# Patient Record
(demographics unavailable — no encounter records)

---

## 2024-10-08 NOTE — HISTORY OF PRESENT ILLNESS
[FreeTextEntry1] : Refer to intake documentation from 9/10/24 for details.  [FreeTextEntry2] : Refer to intake documentation from 9/10/24 for details.  [FreeTextEntry3] : Current psych meds: --Adderall XR   Past psych meds: --Adderall XR 5-10 mg  --Vyvanse  PDMP reviewed, ref# [id number].

## 2024-10-08 NOTE — PLAN
[FreeTextEntry4] : Psychotherapy documentation:  Time spent for Psychotherapy: 20 minutes Modality: Supportive psychotherapy and psychoeducation and CBT Goal: Reduction in symptoms of depression and anxiety.   Focus of session: 1. Discussed with the patient the issues at home due to her 's own mood/anxiety issues in setting of his job, unwillingness to seek help. Discussed how this impacts her and the difficulties around communication between them.  2. Psychoeducation about medications, risk vs benefits of medications, role of behavior activation and coping skills to help in improving their mood and anxiety symptoms and daily functioning.  [FreeTextEntry5] : 1. ADHD --Continue Adderall XR 10 mg daily --Follow-up in 2 months or sooner PRN  2. Adjustment disorder with anxiety --Continue psychotherapy with Elpidio TOMLINSON [facility] reviewed prior to all controlled substance prescriptions, ref#[id number].  - I have given my usual talk about the risks and benefits of all treatment recommendations including alternatives and the risks and benefits of no treatment at all. Patient had the opportunity to have all questions answered to their satisfaction. They expressed understanding and agreement with the above treatment plan. - Educated patient of importance of remaining abstinent from drugs and alcohol. - Discussed that unpredictable effects including cardiorespiratory collapse can result from the combination of illicit drugs and prescribed medications. Patient verbalized understanding. - Emergency procedures were discussed: pt. educated to call 911 or go to nearest ER for worsening of symptoms/suicidal/homicidal ideation. - Patient given opportunity to ask questions and their questions were answered and they expressed understanding and agreement with above plan.

## 2024-10-08 NOTE — PLAN
[FreeTextEntry2] : Goal: Client will develop and utilize a detailed relapse prevention plan that addresses mental health symptoms. Objective: Client will replace negative and self-defeating self-talk with verbalization of realistic and positive cognitive messages. Intervention: Clinician will assist Client to practice using healthy communication techniques in order to communicate his feelings and needs in order to help avoid isolating when experiencing depression or anxiety Intervention: Clinician will teach Client to implement a thought-stopping technique that cognitively interferes with obsessions. Review use of this technique in daily living situation Intervention: Clinician will reinforce positive, reality based cognitive messages that enhance self-confidence and increase adaptive action [Acceptance and Commitment Therapy] : Acceptance and Commitment Therapy  [Cognitive and/or Behavior Therapy] : Cognitive and/or Behavior Therapy  [Psychodynamic Therapy] : Psychodynamic Therapy  [Psychoeducation] : Psychoeducation  [Skills training (all types)] : Skills training (all types)  [Supportive Therapy] : Supportive Therapy [de-identified] : Patient presented as oriented x three and with stable mood. No SI or HI when assessed. Gestalt psychodymnamic chair work done today to help client assert needs, communicate more effectively and track and feel out emotions and experience. This allowed client to find greater calm and confidence in addressing conflict. Supportive therapy provided with emotional support and validaiton.  Mindfulness resources provided.  [FreeTextEntry1] : Client to attend weekly appointments to meet tx goals.

## 2024-10-08 NOTE — PAST MEDICAL HISTORY
[FreeTextEntry1] : Reviewed.   PCP: looking for new provider  Hx of TBI: denied  Hx of Seizures: denied Hx of Migraine HA: denied   Relevant Labs/EKG: none in chart for review

## 2024-10-08 NOTE — DISCUSSION/SUMMARY
[Low acute suicide risk] : Low acute suicide risk [No] : No [Not clinically indicated] : Safety Plan completed/updated (for individuals at risk): Not clinically indicated [FreeTextEntry1] : 9/10/24- Start Adderall XR 10 mg daily. Follow-up in 3-4 weeks. Refer to psychotherapy. Provided with  Health resources. 10/8/24-

## 2024-10-08 NOTE — RISK ASSESSMENT
[Clinical Interview] : Clinical Interview [None in the patient's lifetime] : None in the patient's lifetime [None Known] : none known [No, patient denies ideation or behavior] : No, patient denies ideation or behavior [Low acute suicide risk] : Low acute suicide risk [No] : No [Not clinically indicated] : Safety Plan completed/updated (for individuals at risk): Not clinically indicated

## 2024-10-16 NOTE — PLAN
[FreeTextEntry2] : Goal: Client will develop and utilize a detailed relapse prevention plan that addresses mental health symptoms. Objective: Client will replace negative and self-defeating self-talk with verbalization of realistic and positive cognitive messages. Intervention: Clinician will assist Client to practice using healthy communication techniques in order to communicate his feelings and needs in order to help avoid isolating when experiencing depression or anxiety Intervention: Clinician will teach Client to implement a thought-stopping technique that cognitively interferes with obsessions. Review use of this technique in daily living situation Intervention: Clinician will reinforce positive, reality based cognitive messages that enhance self-confidence and increase adaptive action [Acceptance and Commitment Therapy] : Acceptance and Commitment Therapy  [Cognitive and/or Behavior Therapy] : Cognitive and/or Behavior Therapy  [Psychodynamic Therapy] : Psychodynamic Therapy  [Psychoeducation] : Psychoeducation  [Skills training (all types)] : Skills training (all types)  [Supportive Therapy] : Supportive Therapy [de-identified] : Patient presented as oriented x three and with stable mood. No SI or HI when assessed. Session focus on helping client not reduce emotions and invalidate self and own needs. Psychodynamic work done between parts of self doing same. Patient was able to recognize and feel out difficult emotions and utilize healthier assertions for self for needs and wants. Psychoeducation provided on healthy communication skills and healthy relationship skills.  Mindfulness resources provided.  [FreeTextEntry1] : Client to attend weekly appointments to meet tx goals.

## 2024-10-28 NOTE — REASON FOR VISIT
[Follow-Up] : a follow-up visit [TextBox_44] : SOB, mild persistent asthma, allergies, (+) immunodeficiency

## 2024-10-28 NOTE — HISTORY OF PRESENT ILLNESS
[TextBox_4] : Ms. ROSE is a 30 year old female with a history of COVID-19 x3 (3/2020, 2021, 2023) protracted illness, post-COVID-19 once or twice yearly bronchitis, post-COVID-19 VANCE upon exercise and URIs, seasonal allergies pre-COVID-19, sciatica presenting to the office today for a f/u  pulmonary evaluation. Her chief complaint is    -she notes URI 2 weeks ago -she notes she had an intense cough, potentially mild fever -she notes she was given Emperic, Zithromax, prednisone -she notes she is feeling better at time of visit -she notes she still has an occasional mild cough in the morning, which subsides throughout the day she notes liking Breztri -she notes she had hoarseness when sick but it has subsided -she notes she was having a hard time sleeping on prednisone, but symptoms have subsided -she notes sleeping for 7 hours -she notes complying with her Adderall prescription -she denies any back pain or neck pain -she notes occasional numbness in calf after working out    -she denies any headaches, nausea, emesis, fever, chills, sweats, chest pain, chest pressure, wheezing, palpitations, diarrhea, constipation, dysphagia, vertigo, myalgias, leg swelling, itchy eyes, itchy ears, heartburn, reflux, or sour taste in the mouth.

## 2024-10-28 NOTE — ADDENDUM
[FreeTextEntry1] : Documented by Don Alarcon acting as a scribe for Dr. Tony Kearney on 10/28/2024 All medical record entries made by the Scribe were at my, Dr. Tony Kearney's, direction and personally dictated by me on 10/28/2024 . I have reviewed the chart and agree that the record accurately reflects my personal performance of the history, physical exam, assessment and plan. I have also personally directed, reviewed, and agree with the discharge instructions.

## 2024-10-28 NOTE — ASSESSMENT
[FreeTextEntry1] : Ms. ROSE is a 30 year old female with a history of COVID-19 x3 (3/2020, 2021, 2023) protracted illness, post-COVID-19 once or twice yearly bronchitis, post-COVID-19 VANCE upon exercise and URIs, seasonal allergies pre-COVID-19, sciatica who now comes to the office for a f/u pulmonary evaluation for SOB, mild intermittent asthma, allergies, - now with s/p resp sx- ? mPNA atypical infection- improved; (+) immunodeficiency - s/p pertussis - resolved   Her shortness of breath is multifactorial due to: -poor mechanics of breathing -pulmonary disease   -persistent asthma -cardiac disease    -doubtful   Problem 1: Mild intermittent asthma (stable) -continue Airsupra 2 inhalations QD once available -add Breztri 2 puffs BID -Asthma is believed to be caused by inherited (genetic) and environmental factor, but its exact cause is unknown. Asthma may be triggered by allergens, lung infections, or irritants in the air. Asthma triggers are different for each person.  -Inhaler technique reviewed as well as oral hygiene techniques reviewed with patient. Avoidance of cold air, extremes of temperature, rescue inhaler should be used before exercise. Order of medication reviewed with patient. Recommended use of a cool mist humidifier in the bedroom   Problem 1A: URI - s/p 10/2024  -s/p Zithromax 500mg QDay x 7D  -s/p  Promethazine DM 2 Q6H  problem 1B: (+) immune deficiency -s/p Complete blood work to include: quantitative immunoglobulins, IgG WNL subsets, strep pneumonia titers low, CF panel WNL -recommended Prevnar 20 -Due to the fact that this pt has had more infections than would be expected and immunological blood work is indicated this would include: IgG subclasses, quantitative immunoglobulins, Strep pneumoniae titers as well as Vitamin D levels. Based on this blood work we will be able to decide where the pt needs additional pneumococcal vaccine either Prevnar 13 or pneumovax. Immunology evaluation will also be potentially indicated.  Problem 2: allergies/sinus -Dymista 1 sniff each nostril BID  -continue Zyrtec 5 mg QHS PRN -complete blood work to include: (+) asthma panel, food IgE panel, IgE level, eosinophil level, vitamin D level (NC) -Environmental measures for allergies were encouraged including mattress and pillow covers, air purifier, and environmental controls.   Problem  2A: Low Vitamin D -on Rx -Low vitamin D levels have been associated with asthma exacerbations and increased allergic symptoms. The goal, based on recent information, is maintaining levels between 50-70 and low normal is 30. Recommended 50,000 units every two weeks to once a month depending on the level.  Problem 3: "Orthopedic" Maladies- Sciatica, Neck pain -s/p guided through stretches  Problem 4: cardiac disease -recommended to continue to follow up with Cardiologist if needed  Problem 5: poor breathing mechanics -Recommended Roseanne Waldrop and Mike breathing technique -Proper breathing techniques were reviewed with an emphasis on exhalation. Patient was instructed to breathe in for 1 second and out for 4 seconds. The patient was encouraged to not talk while walking.   Problem 6: health maintenance -recommended yearly flu shot after October 15, 2023 -recommended strep pneumonia vaccines: Prevnar-20 vaccine, followed by Pneumo vaccine 23 one year following after 65 years old. -recommended early intervention for Upper Respiratory Infections (URIs) -recommended regular osteoporosis evaluations -recommended early dermatological evaluations -recommended after the age of 50 to the age of 70, colonoscopy every 5 years   F/P in 4 months She is encouraged to call with any changes, concerns, or questions

## 2024-10-28 NOTE — PROCEDURE
[FreeTextEntry1] : Full PFT reveals normal flows; FEV1 was 4.06L which is 109% of predicted; normal lung volumes; normal diffusion at 32.84, which is 129% of predicted; normal flow volume loop. PFTs were performed to evaluate for SOB, asthma   FENO was 20; a normal value being less than 25 Fractional exhaled nitric oxide (FENO) is regarded as a simple, noninvasive method for assessing eosinophilic airway inflammation. Produced by a variety of cells within the lung, nitric oxide (NO) concentrations are generally low in healthy individuals. However, high concentrations of NO appear to be involved in nonspecific host defense mechanisms and chronic inflammatory diseases such as asthma. The American Thoracic Society (ATS) therefore has recommended using FENO to aid in the diagnosis and monitoring of eosinophilic airway inflammation and asthma, and for identifying steroid responsive individuals whose chronic respiratory symptoms may be caused by airway inflammation.

## 2024-11-05 NOTE — PLAN
[FreeTextEntry2] : Goal: Client will develop and utilize a detailed relapse prevention plan that addresses mental health symptoms. Objective: Client will replace negative and self-defeating self-talk with verbalization of realistic and positive cognitive messages. Intervention: Clinician will assist Client to practice using healthy communication techniques in order to communicate his feelings and needs in order to help avoid isolating when experiencing depression or anxiety Intervention: Clinician will teach Client to implement a thought-stopping technique that cognitively interferes with obsessions. Review use of this technique in daily living situation Intervention: Clinician will reinforce positive, reality based cognitive messages that enhance self-confidence and increase adaptive action [Acceptance and Commitment Therapy] : Acceptance and Commitment Therapy  [Cognitive and/or Behavior Therapy] : Cognitive and/or Behavior Therapy  [Psychodynamic Therapy] : Psychodynamic Therapy  [Psychoeducation] : Psychoeducation  [Skills training (all types)] : Skills training (all types)  [Supportive Therapy] : Supportive Therapy [de-identified] : Patient presented as oriented x three and with stable mood. No SI or HI when assessed. Session focus on absorbing and integrating positivity and healthier messaging for self, based on progress made in relationship, asserting needs, and also in work and other relationships. Client found new confidence in self and was able to recognize self-worth. Supportive therapy provided and client commended on same.  Mindfulness resources provided.  [FreeTextEntry1] : Client to attend weekly appointments to meet tx goals.

## 2024-11-06 NOTE — PROCEDURE
[IUD Placement] : intrauterine device (IUD) placement [Time out performed] : Pre-procedure time out performed.  Patient's name, date of birth and procedure confirmed. [Consent Obtained] : Consent obtained [Risks] : risks [Benefits] : benefits [Alternatives] : alternatives [Patient] : patient [Infection] : infection [Bleeding] : bleeding [Pain] : pain [Expulsion] : expulsion [Failure] : failure [Uterine Perforation] : uterine perforation [Neg Pregnancy Test] : negative pregnancy test [Betadine] : Betadine [Tenaculum] : Tenaculum [Easy Passage] : Easy passage [Post Placement Transvag. US] : post placement transvaginal ultrasound [Mirena IUD] : Mirena IUD [Tolerated Well] : Patient tolerated the procedure well [No Complications] : No complications [None] : None [de-identified] : lidocaine 10cc

## 2024-11-11 NOTE — PLAN
[FreeTextEntry2] : Goal: Client will develop and utilize a detailed relapse prevention plan that addresses mental health symptoms. Objective: Client will replace negative and self-defeating self-talk with verbalization of realistic and positive cognitive messages. Intervention: Clinician will assist Client to practice using healthy communication techniques in order to communicate his feelings and needs in order to help avoid isolating when experiencing depression or anxiety Intervention: Clinician will teach Client to implement a thought-stopping technique that cognitively interferes with obsessions. Review use of this technique in daily living situation Intervention: Clinician will reinforce positive, reality based cognitive messages that enhance self-confidence and increase adaptive action [Acceptance and Commitment Therapy] : Acceptance and Commitment Therapy  [Cognitive and/or Behavior Therapy] : Cognitive and/or Behavior Therapy  [Psychodynamic Therapy] : Psychodynamic Therapy  [Psychoeducation] : Psychoeducation  [Skills training (all types)] : Skills training (all types)  [Supportive Therapy] : Supportive Therapy [de-identified] : Patient presented as oriented x three and with stable mood. No SI or HI when assessed. Session focus putting plans into action and weighing out pros and cons of same and coming to place of greater acceptance that one's happiness is one's happiness, and you can't always make everyone happy. Radical accpetance components utilized in session. Supportive therapy provided with ACT components.  [FreeTextEntry1] : Client to attend weekly appointments to meet tx goals.

## 2024-11-18 NOTE — PLAN
[FreeTextEntry2] : Goal: Client will develop and utilize a detailed relapse prevention plan that addresses mental health symptoms. Objective: Client will replace negative and self-defeating self-talk with verbalization of realistic and positive cognitive messages. Intervention: Clinician will assist Client to practice using healthy communication techniques in order to communicate his feelings and needs in order to help avoid isolating when experiencing depression or anxiety Intervention: Clinician will teach Client to implement a thought-stopping technique that cognitively interferes with obsessions. Review use of this technique in daily living situation Intervention: Clinician will reinforce positive, reality based cognitive messages that enhance self-confidence and increase adaptive action [Acceptance and Commitment Therapy] : Acceptance and Commitment Therapy  [Cognitive and/or Behavior Therapy] : Cognitive and/or Behavior Therapy  [Psychodynamic Therapy] : Psychodynamic Therapy  [Psychoeducation] : Psychoeducation  [Skills training (all types)] : Skills training (all types)  [Supportive Therapy] : Supportive Therapy [de-identified] : Patient presented as oriented x three and with stable mood. No SI or HI when assessed. Session focus difficult family dynamics, and anxiety over family issues as eloping ceremony comes into perview. Supportive therapy provided around same to help provide emotional support and validate needs. Psychoeducation also provided with healthy assertive communication, relationship skills and setting of boundaries. Patient found session helpful for same. Furthe work to continue in this area.  [FreeTextEntry1] : Client to attend weekly appointments to meet tx goals.

## 2024-12-10 NOTE — DISCUSSION/SUMMARY
[Low acute suicide risk] : Low acute suicide risk [No] : No [Not clinically indicated] : Safety Plan completed/updated (for individuals at risk): Not clinically indicated [FreeTextEntry1] : 9/10/24- Start Adderall XR 10 mg daily. Follow-up in 3-4 weeks. Refer to psychotherapy. Provided with [facility] resources. 10/8/24- Continue Adderall XR 10 mg daily. Continue psychotherapy with Elpidio Downey. F/u in 2-3 months.  12/10/24- Continue Adderall XR 10 mg but switch to 5 mg capsules so that she can take a reduced dose on the weekends. F/u in 2 months.

## 2024-12-10 NOTE — PLAN
[FreeTextEntry5] : 1. ADHD --Continue Adderall XR but switch to 5 mg capsules, take 1-2 capsules per day  --Follow-up in 2 months or sooner PRN  2. Adjustment disorder with anxiety --Continue psychotherapy with Elpidio TOMLINSON [facility] reviewed prior to all controlled substance prescriptions, ref#[id number].  - I have given my usual talk about the risks and benefits of all treatment recommendations including alternatives and the risks and benefits of no treatment at all. Patient had the opportunity to have all questions answered to their satisfaction. They expressed understanding and agreement with the above treatment plan. - Educated patient of importance of remaining abstinent from drugs and alcohol. - Discussed that unpredictable effects including cardiorespiratory collapse can result from the combination of illicit drugs and prescribed medications. Patient verbalized understanding. - Emergency procedures were discussed: pt. educated to call 911 or go to nearest ER for worsening of symptoms/suicidal/homicidal ideation. - Patient given opportunity to ask questions and their questions were answered and they expressed understanding and agreement with above plan.

## 2024-12-10 NOTE — HISTORY OF PRESENT ILLNESS
[FreeTextEntry1] : Refer to intake documentation from 9/10/24 for details.  [FreeTextEntry2] : Refer to intake documentation from 9/10/24 for details.  [FreeTextEntry3] : Current psych meds: --Adderall XR (last filled 11/27/24)  Past psych meds: --Adderall XR 5-10 mg  --Vyvanse  PDMP reviewed, ref# [id number].

## 2024-12-10 NOTE — REASON FOR VISIT
[Starting, patient seen in-person within last 6 months] : Telehealth services are being started as patient has seen in-person within last 6 months. [FreeTextEntry4] : 16:15 [FreeTextEntry5] : 16:30  [FreeTextEntry2] : 9/10/24 [FreeTextEntry1] : follow-up psychopharm management

## 2025-02-21 NOTE — HISTORY OF PRESENT ILLNESS
[FreeTextEntry1] : 31 year old pt P0 LMP Mirena IUD presents for a follow-up visit regarding sxs of UTI. She c/o burning with urination.

## 2025-02-21 NOTE — SIGNATURES
[TextEntry] : This note was written by Soniya Hogan on 02/18/2025 actively solely NONI Beauchamp M.D 02/18/2025. All medical record entries made by this scribe were at my  NONI Beauchamp M.D  direction and personally dictated by me on 02/18/2025. I have personally reviewed my chart and agree that the record reflects my personal performance of the history, physical exam, assessment, and plan.

## 2025-02-25 NOTE — PLAN
[FreeTextEntry5] : 1. ADHD --Switch Adderall XR to IR, continue 5 mg daily --When she calls for refill she will specify if she wants to continue the IR or switch back to XR --Follow-up in 3 months or sooner PRN  2. Adjustment disorder with anxiety --Continue psychotherapy- will be transferred to another therapist, as Elpidio Downey is leaving the practice.  -[facility] reviewed prior to all controlled substance prescriptions, ref#[id number].  - I have given my usual talk about the risks and benefits of all treatment recommendations including alternatives and the risks and benefits of no treatment at all. Patient had the opportunity to have all questions answered to their satisfaction. They expressed understanding and agreement with the above treatment plan. - Educated patient of importance of remaining abstinent from drugs and alcohol. - Discussed that unpredictable effects including cardiorespiratory collapse can result from the combination of illicit drugs and prescribed medications. Patient verbalized understanding. - Emergency procedures were discussed: pt. educated to call 911 or go to nearest ER for worsening of symptoms/suicidal/homicidal ideation. - Patient given opportunity to ask questions and their questions were answered and they expressed understanding and agreement with above plan.

## 2025-02-25 NOTE — HISTORY OF PRESENT ILLNESS
[FreeTextEntry1] : Refer to intake documentation from 9/10/24 for details.  [FreeTextEntry2] : Refer to intake documentation from 9/10/24 for details.  [FreeTextEntry3] : Current psych meds: --Adderall XR (last filled 12/13/24)  Past psych meds: --Adderall XR 5-10 mg  --Vyvanse  PDMP reviewed, ref# [id number].

## 2025-02-25 NOTE — DISCUSSION/SUMMARY
[Low acute suicide risk] : Low acute suicide risk [No] : No [Not clinically indicated] : Safety Plan completed/updated (for individuals at risk): Not clinically indicated [FreeTextEntry1] : 9/10/24- Start Adderall XR 10 mg daily. Follow-up in 3-4 weeks. Refer to psychotherapy. Provided with [facility] resources. 10/8/24- Continue Adderall XR 10 mg daily. Continue psychotherapy with Elpidio Downey. F/u in 2-3 months.  12/10/24- Continue Adderall XR 10 mg but switch to 5 mg capsules so that she can take a reduced dose on the weekends. F/u in 2 months.  2/25/25- Switch Adderall XR to IR 5 mg daily. When she calls for the next refill, she will specify whether she wants to continue the IR formulation or return to XR. F/u in 3 months

## 2025-02-25 NOTE — REASON FOR VISIT
[Starting, patient seen in-person within last 6 months] : Telehealth services are being started as patient has seen in-person within last 6 months. [FreeTextEntry4] : 08:30 [FreeTextEntry5] : 08:45 [FreeTextEntry2] : 9/10/24 [FreeTextEntry1] : follow-up psychopharm management

## 2025-04-07 NOTE — PROCEDURE
[FreeTextEntry1] : Full PFT reveals normal flows; FEV1 was 3.71L which is 103% of predicted; normal lung volumes; normal diffusion at 25.92, which is 104% of predicted; normal flow volume loop. PFTs were performed to evaluate for SOB, asthma  FENO was 38; a normal value being less than 25 Fractional exhaled nitric oxide (FENO) is regarded as a simple, noninvasive method for assessing eosinophilic airway inflammation. Produced by a variety of cells within the lung, nitric oxide (NO) concentrations are generally low in healthy individuals. However, high concentrations of NO appear to be involved in nonspecific host defense mechanisms and chronic inflammatory diseases such as asthma. The American Thoracic Society (ATS) therefore has recommended using FENO to aid in the diagnosis and monitoring of eosinophilic airway inflammation and asthma, and for identifying steroid responsive individuals whose chronic respiratory symptoms may be caused by airway inflammation.

## 2025-04-07 NOTE — HISTORY OF PRESENT ILLNESS
[TextBox_4] : Ms. ROSE is a 31-year-old female with a history of COVID-19 x3 (3/2020, 2021, 2023) protracted illness, post-COVID-19 once or twice yearly bronchitis, post-COVID-19 VANCE upon exercise and URIs, seasonal allergies pre-COVID-19, sciatica presenting to the office today for a sick visit. Her chief complaint is   -she notes feeling generally well  -she notes she's compliant with Breztri -she denies breathing issues when she was in [place] 1 month ago -she notes she's been frequently coughing and sick. 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 When she took her mask off, she started coughing  -she notes her coughing started 6 days ago -she notes the cough comes from her throat. She doesn't think it comes from her chest -she notes her head hurts when she tilts her head down -she doesn't plan on traveling via airplane anytime soon -she denies heartburn/reflux -she notes while skiing, she landed with her legs straight, but she didn't have any back pain or Sx at the time other than mild soreness -she notes sciatica due to jogging/walking on softer tread -she notes she couldn't stop coughing when she used her rescue inhaler last night -she notes she's been taking supplemental vitamin D, and her energy levels are higher -she notes good quality of sleep   -she denies any headaches, nausea, emesis, fever, chills, sweats, chest pain, chest pressure, wheezing, palpitations, diarrhea, constipation, dysphagia, vertigo, leg swelling, itchy eyes, itchy ears, or sour taste in the mouth.

## 2025-04-07 NOTE — ADDENDUM
[FreeTextEntry1] : Documented by Edelmira Lopez acting as a scribe for Dr. Tony Kearney on 04/07/2025. All medical record entries made by the Scribe were at my, Dr. Tony Kearney's, direction and personally dictated by me on 04/07/2025. I have reviewed the chart and agree that the record accurately reflects my personal performance of the history, physical exam, assessment and plan. I have also personally directed, reviewed, and agree with the discharge instructions.

## 2025-04-07 NOTE — ASSESSMENT
[FreeTextEntry1] : Ms. ROSE is a 31-year-old female with a history of COVID-19 x3 (3/2020, 2021, 2023) protracted illness, post-COVID-19 once or twice yearly bronchitis, post-COVID-19 VANCE upon exercise and URIs, seasonal allergies pre-COVID-19, sciatica- SOB, mild intermittent asthma, allergies, - s/p resp sx- ? mPNA atypical infection- improved; (+) immunodeficiency - s/p pertussis - resolved- active asthma ?sinusitis 4/2025   Her shortness of breath is multifactorial due to: -poor mechanics of breathing -pulmonary disease   -persistent asthma -cardiac disease    -doubtful at this time   Problem 1: Mild intermittent asthma (active) -continue Airsupra 2 inhalations QD once available -continue Breztri 2 puffs BID -add Prednisone 20 mg x 7 days, 10 mg x 7 days 4/2025 -Information sheet given to the patient to be reviewed, this medication is never to be used without consulting the prescribing physician. Proper dietary restraint is necessary specifically salt containing foods, if any reaction may occur should be reported.  -Asthma is believed to be caused by inherited (genetic) and environmental factor, but its exact cause is unknown. Asthma may be triggered by allergens, lung infections, or irritants in the air. Asthma triggers are different for each person.  -Inhaler technique reviewed as well as oral hygiene techniques reviewed with patient. Avoidance of cold air, extremes of temperature, rescue inhaler should be used before exercise. Order of medication reviewed with patient. Recommended use of a cool mist humidifier in the bedroom   Problem 1A: URI - s/p 10/2024  -s/p Zithromax 500mg QDay x 7D  -s/p  Promethazine DM 2 Q6H  problem 1B: (+) immunodeficiency -s/p blood work: quantitative immunoglobulins, IgG WNL subsets, strep pneumonia titers low, CF panel WNL -s/p Prevnar-20 vaccine -Due to the fact that this pt has had more infections than would be expected and immunological blood work is indicated this would include: IgG subclasses, quantitative immunoglobulins, Strep pneumoniae titers as well as Vitamin D levels. Based on this blood work we will be able to decide where the pt needs additional pneumococcal vaccine either Prevnar 13 or pneumovax. Immunology evaluation will also be potentially indicated.  Problem 2: allergies/sinus -Dymista 1 sniff each nostril BID  -continue Zyrtec 5 mg QHS PRN -s/p blood work: (+) asthma panel, food IgE panel, IgE level, eosinophil level, vitamin D level (low) -Environmental measures for allergies were encouraged including mattress and pillow covers, air purifier, and environmental controls.   Problem 2A: Low Vitamin D -on Rx -Low vitamin D levels have been associated with asthma exacerbations and increased allergic symptoms. The goal, based on recent information, is maintaining levels between 50-70 and low normal is 30. Recommended 50,000 units every two weeks to once a month depending on the level.  Problem 2B: Acute sinusitis 4/2025 -add Augmentin 875 mg BID x14 days  -Patient has a clinical scenario most c/w acute sinusitis the etiology of which is unknown (bacterial/viral/fungal). Hydration, steaming, intranasal saline is needed.   Problem 3: "Orthopedic" Maladies- Sciatica, Neck pain -guided through stretches  Problem 4: cardiac disease -recommended to continue to follow up with Cardiologist if needed  Problem 5: poor breathing mechanics -Recommended Roseanne Waldrop and Mike breathing technique -Proper breathing techniques were reviewed with an emphasis on exhalation. Patient was instructed to breathe in for 1 second and out for 4 seconds. The patient was encouraged to not talk while walking.   Problem 6: health maintenance -recommended yearly flu shot after October 15, 2024 -recommended strep pneumonia vaccines: Prevnar-20 vaccine, followed by Pneumo vaccine 23 one year following after 65 years old. -recommended early intervention for Upper Respiratory Infections (URIs) -recommended regular osteoporosis evaluations -recommended early dermatological evaluations -recommended after the age of 50 to the age of 70, colonoscopy every 5 years   F/P in 4 months She is encouraged to call with any changes, concerns, or questions

## 2025-04-07 NOTE — PHYSICAL EXAM
[No Acute Distress] : no acute distress [Normal Oropharynx] : normal oropharynx [II] : Mallampati Class: II [Normal Appearance] : normal appearance [No Neck Mass] : no neck mass [Normal Rate/Rhythm] : normal rate/rhythm [Normal S1, S2] : normal s1, s2 [No Murmurs] : no murmurs [No Resp Distress] : no resp distress [Clear to Auscultation Bilaterally] : clear to auscultation bilaterally [No Abnormalities] : no abnormalities [Benign] : benign [Normal Gait] : normal gait [No Clubbing] : no clubbing [No Cyanosis] : no cyanosis [No Edema] : no edema [FROM] : FROM [Normal Color/ Pigmentation] : normal color/ pigmentation [No Focal Deficits] : no focal deficits [Oriented x3] : oriented x3 [Normal Affect] : normal affect [TextBox_11] : inflammation in left ear [TextBox_68] : I:E ratio 1:3; clear

## 2025-04-29 NOTE — DISCUSSION/SUMMARY
[Low acute suicide risk] : Low acute suicide risk [No] : No [Not clinically indicated] : Safety Plan completed/updated (for individuals at risk): Not clinically indicated [FreeTextEntry1] : 9/10/24- Start Adderall XR 10 mg daily. Follow-up in 3-4 weeks. Refer to psychotherapy. Provided with [facility] resources. 10/8/24- Continue Adderall XR 10 mg daily. Continue psychotherapy with Elpidio Downey. F/u in 2-3 months.  12/10/24- Continue Adderall XR 10 mg but switch to 5 mg capsules so that she can take a reduced dose on the weekends. F/u in 2 months.  2/25/25- Switch Adderall XR to IR 5 mg daily. When she calls for the next refill, she will specify whether she wants to continue the IR formulation or return to XR. F/u in 3 months 4/29/25- Continue Adderall IR 5 mg daily. Follow-up in 3 months.

## 2025-04-29 NOTE — HISTORY OF PRESENT ILLNESS
[FreeTextEntry1] : Refer to intake documentation from 9/10/24 for details.  [FreeTextEntry2] : Refer to intake documentation from 9/10/24 for details.  [FreeTextEntry3] : Current psych meds: --Adderall IR (last filled 4/14/25 for 14 days)  Past psych meds: --Adderall XR 5-10 mg  --Vyvanse  PDMP reviewed, ref# [id number].

## 2025-04-29 NOTE — PLAN
[FreeTextEntry5] : 1. ADHD --Continue Adderall IR 5 mg daily --Follow-up in 3 months or sooner PRN  2. Adjustment disorder with anxiety --Continue psychotherapy- will be transferred to another therapist, as Elpidio Downey is leaving the practice.  -[facility] reviewed prior to all controlled substance prescriptions, ref#[id number].  - I have given my usual talk about the risks and benefits of all treatment recommendations including alternatives and the risks and benefits of no treatment at all. Patient had the opportunity to have all questions answered to their satisfaction. They expressed understanding and agreement with the above treatment plan. - Educated patient of importance of remaining abstinent from drugs and alcohol. - Discussed that unpredictable effects including cardiorespiratory collapse can result from the combination of illicit drugs and prescribed medications. Patient verbalized understanding. - Emergency procedures were discussed: pt. educated to call 911 or go to nearest ER for worsening of symptoms/suicidal/homicidal ideation. - Patient given opportunity to ask questions and their questions were answered and they expressed understanding and agreement with above plan.

## 2025-04-29 NOTE — REASON FOR VISIT
[Continuing, patient seen in-person within last 12 months] : Telehealth services are continuing as patient has been seen in-person within last 12 months. [Telehealth (audio & video) - Individual/Group] : This visit was provided via telehealth using real-time 2-way audio visual technology. [Medical Office: ([facility])___] : The provider was located at the medical office in [unfilled]. [Home] : The patient, [unfilled], was located at home, [unfilled], at the time of the visit. [Participant(s) identity verified] : Participant(s) identity verified. [Verbal consent obtained from patient/other participant(s)] : Verbal consent for telehealth/telephonic services obtained from patient/other participant(s) [FreeTextEntry4] : 08:30 [FreeTextEntry5] : 08:45 [FreeTextEntry2] : 9/10/24 [Patient] : Patient [FreeTextEntry1] : follow-up psychopharm management

## 2025-05-05 NOTE — HISTORY OF PRESENT ILLNESS
[de-identified] : 5/5/25: F/u LS. Was skiing and landed a little off. Started noticing radicular sxs down. Has been ongoing for 6+ weeks. LLE to posterior glute. N/t in L calf. Notes mild weakness in L leg while in yoga. Notes improvement with muscle relaxers and prednisone.   5/23/24: here for fu, po#3 s/p s/p L L5-S1 discectomy 1/31/24. Patient reports she is doing well, patient reports occasional low back stiffness. Leg pain and numbness has resolved. Patient continues PT and HEP with good improvement. Patient has been able to increase activity including pilates and walk/run. Discussed activity modifications today.  second pov s/p L L5-S1 discectomy 1/31/24. Doing well. Preop symptoms resolving. Some intermittent numbness. doing hep. no meds. working [] : no [FreeTextEntry5] : Follow Up- L Spine. Doing very well. Pilates daily has been helping greatly. Still attending PT.

## 2025-05-05 NOTE — IMAGING
[de-identified] : LSPINE Inspection: No rash or ecchymosis Palpation: L tenderness to palpation or spasm in lumbar paraspinal musculature, no SI joint tenderness to palpation ROM: Pinching sensation with extension, otherwise full Strength: 5/5 bilateral hip flexors, knee extensors, ankle dorsiflexors, EHL, ankle plantarflexors Sensation: Sensation present to light touch bilateral L2-S1 distributions Provocative maneuvers: Positive L straight leg raise Heel and toe walk intact

## 2025-05-05 NOTE — ASSESSMENT
[FreeTextEntry1] : Was skiing and landed a little off. Started noticing radicular sxs down LLE to posterior glute. Notes mild weakness in L leg while in yoga. n/t in L calf posteriorly. Notes improvement with muscle relaxers and prednisone. Exam: L TTP lumbar paraspinals and pinching sensation with extension, + L SLR, otherwise no red flags. Discussed L lumbar TPI, patient defers today. Updated L MRI w/ and w/o contrast needed to eval surgical site to r/o another HNP. F/u to discuss results

## 2025-05-13 NOTE — IMAGING
[de-identified] : LSPINE Inspection: No rash or ecchymosis Palpation: L tenderness to palpation or spasm in lumbar paraspinal musculature, no SI joint tenderness to palpation ROM: Pinching sensation with extension, otherwise full Strength: 5/5 bilateral hip flexors, knee extensors, ankle dorsiflexors, EHL, ankle plantarflexors Sensation: Sensation present to light touch bilateral L2-S1 distributions Provocative maneuvers: Positive L straight leg raise Heel and toe walk intact

## 2025-05-13 NOTE — HISTORY OF PRESENT ILLNESS
[Lower back] : lower back [0] : 0 [Shooting] : shooting [Constant] : constant [Nothing helps with pain getting better] : Nothing helps with pain getting better [de-identified] : DOS: 01/31/24 left L5-S1 mini-open diskectomy   5/13/25: Follow up L Spine. Persisting left calf numbness and lower back stiffness, especially with certain movement. MRI review @[facility].   5/5/25: F/u LS. Was skiing and landed a little off. Started noticing radicular sxs down. Has been ongoing for 6+ weeks. LLE to posterior glute. N/t in L calf. Notes mild weakness in L leg while in yoga. Notes improvement with muscle relaxers and prednisone.   5/23/24: here for fu, po#3 s/p s/p L L5-S1 discectomy 1/31/24. Patient reports she is doing well, patient reports occasional low back stiffness. Leg pain and numbness has resolved. Patient continues PT and HEP with good improvement. Patient has been able to increase activity including pilates and walk/run. Discussed activity modifications today.  second pov s/p L L5-S1 discectomy 1/31/24. Doing well. Preop symptoms resolving. Some intermittent numbness. doing hep. no meds. working [] : no [FreeTextEntry5] : Follow Up- L Spine. Doing very well. Pilates daily has been helping greatly. Still attending PT.

## 2025-05-13 NOTE — ASSESSMENT
[FreeTextEntry1] : 30 y/o F with hx of left mini-open diskectomy on 01/2024. with acute LLE radiculopathy after skiing incident. Updated MRI: L5-S1 s/p left hemilaminectomy with small re-herniation on the left impinging on left S1 root. Subtle weakness in LLE. Sxs are currently manageable with meds.   - Reviewed tx options: given exam w/o red flags and sxs are manageable with meds & HEP, strongly encourage c/w conservative care for now.  - Will resume PT for lower back and add neck.  - F/up in 6 weeks or sooner if sxs worsen.

## 2025-07-23 NOTE — HISTORY OF PRESENT ILLNESS
[TextBox_4] : Ms. ROSE is a 31-year-old female with a history of COVID-19 x3 (3/2020, 2021, 2023) protracted illness, post-COVID-19 once or twice yearly bronchitis, post-COVID-19 VANCE upon exercise and URIs, seasonal allergies pre-COVID-19, sciatica presenting to the office today for a follow up.   -she notes s/p spinal surgery  -she notes doing physical therapy for her back  -she notes her left side is weak compared to her right side  -she notes feeling generally well  -she notes sickness after intbuation  -she notes two surgeries in one week  -she notes sputum production post-surgery  -she notes sinuses are quiet currently -she notes sinus were active s/p surgery  -she notes bowels are regular -she notes she is on a plant-based diet  -she denies taking any supplements  -she notes her breathing is stable currently   -she denies any headaches, nausea, emesis, fever, chills, sweats, chest pain, chest pressure, coughing, wheezing, palpitations, diarrhea, constipation, dysphagia, vertigo, arthralgias, myalgias, leg swelling, itchy eyes, itchy ears, heartburn, reflux, or sour taste in the mouth

## 2025-07-23 NOTE — PROCEDURE
[FreeTextEntry1] : PFTs revealed normal flows; FEV1 was 3.10 L, which is 86% of predicted; normal flow volume loop.  PFTs were performed to evaluate for asthma  FENO was 16; a normal value being less than 25  Fractional exhaled nitric oxide (FENO) is regarded as a simple, noninvasive method for assessing eosinophilic airway inflammation. Produced by a variety of cells within the lung, nitric oxide (NO) concentrations are generally low in healthy individuals. However, high concentrations of NO appear to be involved in nonspecific host defense mechanisms and chronic inflammatory diseases such as asthma. The American Thoracic Society (ATS) therefore has recommended using FENO to aid in the diagnosis and monitoring of eosinophilic airway inflammation and asthma, and for identifying steroid responsive individuals whose chronic respiratory symptoms may be caused by airway inflammation.

## 2025-07-23 NOTE — ADDENDUM
[FreeTextEntry1] : Documented by Simran Aguilar acting as a scribe for Dr. Tony Kearney on 07/23/2025. All medical record entries made by the Scribe were at my, Dr. Tony Kearney's, direction and personally dictated by me on 07/23/2025.  I have reviewed the chart and agree that the record accurately reflects my personal performance of the history, physical exam, assessment and plan. I have also personally directed, reviewed, and agree with the discharge instructions.

## 2025-07-23 NOTE — ASSESSMENT
[FreeTextEntry1] : Ms. ROSE is a 31-year-old female with a history of COVID-19 x3 (3/2020, 2021, 2023) protracted illness, post-COVID-19 once or twice yearly bronchitis, post-COVID-19 VANCE upon exercise and URIs, seasonal allergies pre-COVID-19, sciatica- SOB, mild intermittent asthma, allergies, - s/p resp sx- ? mPNA atypical infection- improved; (+) immunodeficiency - s/p pertussis - resolved- active asthma ?sinusitis 4/2025-s/p appendectomy/ post op spine laminectomy-recovery    Her shortness of breath is multifactorial due to: -poor mechanics of breathing -pulmonary disease   -persistent asthma -cardiac disease    -doubtful at this time   Problem 1: Mild intermittent asthma (quiet ) -continue Airsupra 2 inhalations QD once available -continue Breztri 2 puffs BID -s/p Prednisone 20 mg x 7 days, 10 mg x 7 days 4/2025 -Information sheet given to the patient to be reviewed, this medication is never to be used without consulting the prescribing physician. Proper dietary restraint is necessary specifically salt containing foods, if any reaction may occur should be reported.  -Asthma is believed to be caused by inherited (genetic) and environmental factor, but its exact cause is unknown. Asthma may be triggered by allergens, lung infections, or irritants in the air. Asthma triggers are different for each person.  -Inhaler technique reviewed as well as oral hygiene techniques reviewed with patient. Avoidance of cold air, extremes of temperature, rescue inhaler should be used before exercise. Order of medication reviewed with patient. Recommended use of a cool mist humidifier in the bedroom   Problem 1A: URI - s/p 10/2024  -s/p Zithromax 500mg QDay x 7D  -s/p  Promethazine DM 2 Q6H  problem 1B: (+) immunodeficiency -s/p blood work: quantitative immunoglobulins, IgG WNL subsets, strep pneumonia titers low, CF panel WNL -s/p Prevnar-20 vaccine -Due to the fact that this pt has had more infections than would be expected and immunological blood work is indicated this would include: IgG subclasses, quantitative immunoglobulins, Strep pneumoniae titers as well as Vitamin D levels. Based on this blood work we will be able to decide where the pt needs additional pneumococcal vaccine either Prevnar 13 or pneumovax. Immunology evaluation will also be potentially indicated.  Problem 2: allergies/sinus -Dymista 1 sniff each nostril BID  -continue Zyrtec 5 mg QHS PRN -s/p blood work: (+) asthma panel, food IgE panel, IgE level, eosinophil level, vitamin D level (low) -Environmental measures for allergies were encouraged including mattress and pillow covers, air purifier, and environmental controls.   Problem 2A: Low Vitamin D -on Rx -Low vitamin D levels have been associated with asthma exacerbations and increased allergic symptoms. The goal, based on recent information, is maintaining levels between 50-70 and low normal is 30. Recommended 50,000 units every two weeks to once a month depending on the level.  Problem 2B: Acute sinusitis 4/2025 (resolved) -s/p Augmentin 875 mg BID x14 days  -Patient has a clinical scenario most c/w acute sinusitis the etiology of which is unknown (bacterial/viral/fungal). Hydration, steaming, intranasal saline is needed.   Problem 3: "Orthopedic" Maladies- Sciatica, Neck pain -guided through stretches  Problem 4: cardiac disease -recommended to continue to follow up with Cardiologist if needed  Problem 5: poor breathing mechanics -Recommended Roseanne Waldrop and Mike breathing technique -Proper breathing techniques were reviewed with an emphasis on exhalation. Patient was instructed to breathe in for 1 second and out for 4 seconds. The patient was encouraged to not talk while walking.   Problem 6: health maintenance -recommended yearly flu shot after October 15, 2024 -recommended strep pneumonia vaccines: Prevnar-20 vaccine, followed by Pneumo vaccine 23 one year following after 65 years old. -recommended early intervention for Upper Respiratory Infections (URIs) -recommended regular osteoporosis evaluations -recommended early dermatological evaluations -recommended after the age of 50 to the age of 70, colonoscopy every 5 years   F/P in 4 months She is encouraged to call with any changes, concerns, or questions